# Patient Record
Sex: FEMALE | Race: WHITE
[De-identification: names, ages, dates, MRNs, and addresses within clinical notes are randomized per-mention and may not be internally consistent; named-entity substitution may affect disease eponyms.]

---

## 2020-09-09 ENCOUNTER — HOSPITAL ENCOUNTER (INPATIENT)
Dept: HOSPITAL 84 - D.ER | Age: 50
LOS: 7 days | Discharge: NURSING, REHAB OR LONG-TERM CARE | DRG: 481 | End: 2020-09-16
Attending: FAMILY MEDICINE | Admitting: FAMILY MEDICINE
Payer: COMMERCIAL

## 2020-09-09 VITALS — DIASTOLIC BLOOD PRESSURE: 75 MMHG | SYSTOLIC BLOOD PRESSURE: 134 MMHG

## 2020-09-09 VITALS — DIASTOLIC BLOOD PRESSURE: 66 MMHG | SYSTOLIC BLOOD PRESSURE: 130 MMHG

## 2020-09-09 VITALS — SYSTOLIC BLOOD PRESSURE: 131 MMHG | DIASTOLIC BLOOD PRESSURE: 78 MMHG

## 2020-09-09 VITALS — DIASTOLIC BLOOD PRESSURE: 72 MMHG | SYSTOLIC BLOOD PRESSURE: 140 MMHG

## 2020-09-09 VITALS — SYSTOLIC BLOOD PRESSURE: 143 MMHG | DIASTOLIC BLOOD PRESSURE: 77 MMHG

## 2020-09-09 VITALS
HEIGHT: 64 IN | HEIGHT: 64 IN | BODY MASS INDEX: 50.02 KG/M2 | WEIGHT: 293 LBS | BODY MASS INDEX: 50.02 KG/M2 | WEIGHT: 293 LBS | BODY MASS INDEX: 50.02 KG/M2

## 2020-09-09 VITALS — SYSTOLIC BLOOD PRESSURE: 150 MMHG | DIASTOLIC BLOOD PRESSURE: 72 MMHG

## 2020-09-09 VITALS — DIASTOLIC BLOOD PRESSURE: 79 MMHG | SYSTOLIC BLOOD PRESSURE: 151 MMHG

## 2020-09-09 VITALS — SYSTOLIC BLOOD PRESSURE: 164 MMHG | DIASTOLIC BLOOD PRESSURE: 79 MMHG
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
## 2020-09-09 DIAGNOSIS — D72.829: ICD-10-CM

## 2020-09-09 DIAGNOSIS — M54.9: ICD-10-CM

## 2020-09-09 DIAGNOSIS — G89.29: ICD-10-CM

## 2020-09-09 DIAGNOSIS — I10: ICD-10-CM

## 2020-09-09 DIAGNOSIS — S72.402A: Primary | ICD-10-CM

## 2020-09-09 DIAGNOSIS — S82.292A: ICD-10-CM

## 2020-09-09 LAB
ALBUMIN SERPL-MCNC: 3.1 G/DL (ref 3.4–5)
ALP SERPL-CCNC: 83 U/L (ref 30–120)
ALT SERPL-CCNC: 20 U/L (ref 10–68)
ANION GAP SERPL CALC-SCNC: 12.9 MMOL/L (ref 8–16)
APTT BLD: 31.4 SECONDS (ref 22.8–39.4)
BACTERIA #/AREA URNS HPF: (no result) /HPF
BASOPHILS NFR BLD AUTO: 0.2 % (ref 0–2)
BILIRUB SERPL-MCNC: 0.65 MG/DL (ref 0.2–1.3)
BILIRUB SERPL-MCNC: NEGATIVE MG/DL
BUN SERPL-MCNC: 15 MG/DL (ref 7–18)
CALCIUM SERPL-MCNC: 9.4 MG/DL (ref 8.5–10.1)
CHLORIDE SERPL-SCNC: 102 MMOL/L (ref 98–107)
CO2 SERPL-SCNC: 27.3 MMOL/L (ref 21–32)
CREAT SERPL-MCNC: 0.9 MG/DL (ref 0.6–1.3)
EOSINOPHIL NFR BLD: 1.1 % (ref 0–7)
ERYTHROCYTE [DISTWIDTH] IN BLOOD BY AUTOMATED COUNT: 13.5 % (ref 11.5–14.5)
GLOBULIN SER-MCNC: 4.8 G/L
GLUCOSE SERPL-MCNC: 146 MG/DL (ref 74–106)
HCT VFR BLD CALC: 42.3 % (ref 36–48)
HGB BLD-MCNC: 13.6 G/DL (ref 12–16)
IMM GRANULOCYTES NFR BLD: 0.2 % (ref 0–5)
INR PPP: 1.08 (ref 0.85–1.17)
KETONES UR STRIP-MCNC: NEGATIVE MG/DL
LYMPHOCYTES NFR BLD AUTO: 11.2 % (ref 15–50)
MCH RBC QN AUTO: 28.2 PG (ref 26–34)
MCHC RBC AUTO-ENTMCNC: 32.2 G/DL (ref 31–37)
MCV RBC: 87.8 FL (ref 80–100)
MONOCYTES NFR BLD: 4.1 % (ref 2–11)
NEUTROPHILS NFR BLD AUTO: 83.2 % (ref 40–80)
NITRITE UR-MCNC: NEGATIVE MG/ML
OSMOLALITY SERPL CALC.SUM OF ELEC: 279 MOSM/KG (ref 275–300)
PH UR STRIP: 7 [PH] (ref 5–6)
PLATELET # BLD: 385 10X3/UL (ref 130–400)
PMV BLD AUTO: 9.6 FL (ref 7.4–10.4)
POTASSIUM SERPL-SCNC: 4.2 MMOL/L (ref 3.5–5.1)
PROT SERPL-MCNC: 7.9 G/DL (ref 6.4–8.2)
PROTHROMBIN TIME: 14 SECONDS (ref 11.6–15)
RBC # BLD AUTO: 4.82 10X6/UL (ref 4–5.4)
SODIUM SERPL-SCNC: 138 MMOL/L (ref 136–145)
SQUAMOUS #/AREA URNS HPF: (no result) /HPF (ref 0–5)
UROBILINOGEN UR-MCNC: NORMAL MG/DL
WBC # BLD AUTO: 12.3 10X3/UL (ref 4.8–10.8)
WBC #/AREA URNS HPF: (no result) /HPF (ref 0–5)

## 2020-09-09 NOTE — NUR
RECEIVED PT TO ROOM #2239
FROM ER VIA BED. NO ACUTE DISTRESS NOTED. DENIES PAIN AT THIS
TIME. NS AT 150ML/HR TO PIV IN RIGHT HAND.

## 2020-09-10 VITALS — DIASTOLIC BLOOD PRESSURE: 75 MMHG | SYSTOLIC BLOOD PRESSURE: 154 MMHG

## 2020-09-10 VITALS — SYSTOLIC BLOOD PRESSURE: 145 MMHG | DIASTOLIC BLOOD PRESSURE: 72 MMHG

## 2020-09-10 VITALS — DIASTOLIC BLOOD PRESSURE: 93 MMHG | SYSTOLIC BLOOD PRESSURE: 138 MMHG

## 2020-09-10 LAB
ALBUMIN SERPL-MCNC: 2.7 G/DL (ref 3.4–5)
ALP SERPL-CCNC: 78 U/L (ref 30–120)
ALT SERPL-CCNC: 35 U/L (ref 10–68)
ANION GAP SERPL CALC-SCNC: 12.4 MMOL/L (ref 8–16)
BASOPHILS NFR BLD AUTO: 0.1 % (ref 0–2)
BILIRUB SERPL-MCNC: 0.74 MG/DL (ref 0.2–1.3)
BUN SERPL-MCNC: 18 MG/DL (ref 7–18)
CALCIUM SERPL-MCNC: 8.5 MG/DL (ref 8.5–10.1)
CHLORIDE SERPL-SCNC: 105 MMOL/L (ref 98–107)
CO2 SERPL-SCNC: 27.9 MMOL/L (ref 21–32)
CREAT SERPL-MCNC: 0.8 MG/DL (ref 0.6–1.3)
EOSINOPHIL NFR BLD: 1 % (ref 0–7)
ERYTHROCYTE [DISTWIDTH] IN BLOOD BY AUTOMATED COUNT: 13.4 % (ref 11.5–14.5)
GLOBULIN SER-MCNC: 3.6 G/L
GLUCOSE SERPL-MCNC: 122 MG/DL (ref 74–106)
HCT VFR BLD CALC: 38.7 % (ref 36–48)
HGB BLD-MCNC: 12.5 G/DL (ref 12–16)
IMM GRANULOCYTES NFR BLD: 0.2 % (ref 0–5)
LYMPHOCYTES NFR BLD AUTO: 21.6 % (ref 15–50)
MAGNESIUM SERPL-MCNC: 2 MG/DL (ref 1.8–2.4)
MCH RBC QN AUTO: 28.7 PG (ref 26–34)
MCHC RBC AUTO-ENTMCNC: 32.3 G/DL (ref 31–37)
MCV RBC: 88.8 FL (ref 80–100)
MONOCYTES NFR BLD: 7 % (ref 2–11)
NEUTROPHILS NFR BLD AUTO: 70.1 % (ref 40–80)
OSMOLALITY SERPL CALC.SUM OF ELEC: 283 MOSM/KG (ref 275–300)
PLATELET # BLD: 355 10X3/UL (ref 130–400)
PMV BLD AUTO: 9.6 FL (ref 7.4–10.4)
POTASSIUM SERPL-SCNC: 4.3 MMOL/L (ref 3.5–5.1)
PROT SERPL-MCNC: 6.3 G/DL (ref 6.4–8.2)
RBC # BLD AUTO: 4.36 10X6/UL (ref 4–5.4)
SODIUM SERPL-SCNC: 141 MMOL/L (ref 136–145)
WBC # BLD AUTO: 10.3 10X3/UL (ref 4.8–10.8)

## 2020-09-10 PROCEDURE — 0QSC04Z REPOSITION LEFT LOWER FEMUR WITH INTERNAL FIXATION DEVICE, OPEN APPROACH: ICD-10-PCS | Performed by: ORTHOPAEDIC SURGERY

## 2020-09-10 NOTE — NUR
MANY NEEDS SAW TO AS WELL AS TOTALL BED CHANGE AT THIS TIME BED LOW AND LOCKED
AND CALL LIGHT IS IN REACH

## 2020-09-10 NOTE — NUR
PT IS RESTING IN BED WITH EYES OPEN. RESPIRATIONS ARE EVEN AND UNLABORED.
FAMILY AT BEDSIDE. PT DENIES PRESENCE OF N/V/AT THIS TIME. PT DENIES PRESENCE
OF NUMBNESS/TINGLING TO BUE AND BLE. BRUISING NOTED TO LLE. BLE PEDAL PULSES
PALP AND BLE CAP REFILL IS < 3 SEC. O2 VIA NC @ 2L. PIV TO RIGHT HAND INFUSING
WITHOUT DIFFICULTY PER ORDER. BED IS IN THE LOWEST POSITION. CALL LIGHT AND
BEDSIDE TABLE ARE WITHIN REACH. SIDE RAILS X 2. PT DENIES FURTHER NEEDS. WILL
CONT TO MONITOR.

## 2020-09-10 NOTE — NUR
Rehab Note- Acute Inpatient Rehab prescreen order received. The patient has
[employer] insurance and will require a PreAuth prior to an inpatient acute rehab
stay. She had surgery today. Will need PT & OT Eval for PreAuth process. Will
follow at this time to await PT & OT Evals. Thank you for this referral!
Nessa Tidwell RN
Clinical Liaison, [facility]

## 2020-09-10 NOTE — NUR
MODERATE AMOUNT OF BLOODY DRAINAGE NOTED TO LEFT HIP/UPPER THIGH DRESSING
NOTED. DRESSING REINFORCED WITH 4X4 GUAZE AND TAPE. PT TOLERATED WELL.

## 2020-09-10 NOTE — NUR
PT ARRIVES TO ROOM VIA BED ESCORTED BY RECOVERY ROOM STAFF. PT IS AROUSABLE
WITH VERBAL STIMULATION AND IS AAOX 4 UPON AROUSAL. DRESSING TO LEFT THIGH
NOTED AND IS CDI. KNEE IMMOBILIZER ON LLE. PT REPORTS NUMBNESS TO LLE AND IS
UNABLE TO WIGGLE TOES AT THIS TIME. PT DENIES PRESENCE OF PAIN/N/V. FAMILY IS
AT BEDSIDE. FALL PRECAUTIONS IN PLACE. VSS. SEE FLOWSHEET. BED IS IN THE
LOWEST POSITION. CALL LIGHT AND BEDSIDE TABLE ARE WITHIN REACH. SIDE RAILS X
2. PT DENIES FURTHER NEEDS. WILL CONT TO MONITOR.

## 2020-09-10 NOTE — NUR
BONE FOAM AND BUMP UNDER LEFT HIP USED FOR OPTIMAL OPERATIVE
POSITION. ALCOHOL AND BLUE TOWELS USED TO CLEAN ENTIRE LEG BEFORE
CHLORAPREPPING.

## 2020-09-11 VITALS — SYSTOLIC BLOOD PRESSURE: 144 MMHG | DIASTOLIC BLOOD PRESSURE: 59 MMHG

## 2020-09-11 VITALS — SYSTOLIC BLOOD PRESSURE: 121 MMHG | DIASTOLIC BLOOD PRESSURE: 66 MMHG

## 2020-09-11 VITALS — SYSTOLIC BLOOD PRESSURE: 161 MMHG | DIASTOLIC BLOOD PRESSURE: 72 MMHG

## 2020-09-11 VITALS — DIASTOLIC BLOOD PRESSURE: 68 MMHG | SYSTOLIC BLOOD PRESSURE: 148 MMHG

## 2020-09-11 VITALS — DIASTOLIC BLOOD PRESSURE: 65 MMHG | SYSTOLIC BLOOD PRESSURE: 152 MMHG

## 2020-09-11 VITALS — DIASTOLIC BLOOD PRESSURE: 68 MMHG | SYSTOLIC BLOOD PRESSURE: 138 MMHG

## 2020-09-11 LAB
ALBUMIN SERPL-MCNC: 2.3 G/DL (ref 3.4–5)
ALP SERPL-CCNC: 78 U/L (ref 30–120)
ALT SERPL-CCNC: 47 U/L (ref 10–68)
ANION GAP SERPL CALC-SCNC: 11.8 MMOL/L (ref 8–16)
BASOPHILS NFR BLD AUTO: 0.1 % (ref 0–2)
BILIRUB SERPL-MCNC: 0.39 MG/DL (ref 0.2–1.3)
BUN SERPL-MCNC: 20 MG/DL (ref 7–18)
CALCIUM SERPL-MCNC: 8.1 MG/DL (ref 8.5–10.1)
CHLORIDE SERPL-SCNC: 108 MMOL/L (ref 98–107)
CO2 SERPL-SCNC: 24 MMOL/L (ref 21–32)
CREAT SERPL-MCNC: 1 MG/DL (ref 0.6–1.3)
EOSINOPHIL NFR BLD: 0.3 % (ref 0–7)
ERYTHROCYTE [DISTWIDTH] IN BLOOD BY AUTOMATED COUNT: 13.6 % (ref 11.5–14.5)
GLOBULIN SER-MCNC: 3.7 G/L
GLUCOSE SERPL-MCNC: 119 MG/DL (ref 74–106)
HCT VFR BLD CALC: 32.1 % (ref 36–48)
HGB BLD-MCNC: 10.3 G/DL (ref 12–16)
IMM GRANULOCYTES NFR BLD: 0.3 % (ref 0–5)
LYMPHOCYTES NFR BLD AUTO: 20.5 % (ref 15–50)
MAGNESIUM SERPL-MCNC: 2 MG/DL (ref 1.8–2.4)
MCH RBC QN AUTO: 28.2 PG (ref 26–34)
MCHC RBC AUTO-ENTMCNC: 32.1 G/DL (ref 31–37)
MCV RBC: 87.9 FL (ref 80–100)
MONOCYTES NFR BLD: 9.7 % (ref 2–11)
NEUTROPHILS NFR BLD AUTO: 69.1 % (ref 40–80)
OSMOLALITY SERPL CALC.SUM OF ELEC: 282 MOSM/KG (ref 275–300)
PLATELET # BLD: 315 10X3/UL (ref 130–400)
PMV BLD AUTO: 9.5 FL (ref 7.4–10.4)
POTASSIUM SERPL-SCNC: 3.8 MMOL/L (ref 3.5–5.1)
PROT SERPL-MCNC: 6 G/DL (ref 6.4–8.2)
RBC # BLD AUTO: 3.65 10X6/UL (ref 4–5.4)
SODIUM SERPL-SCNC: 140 MMOL/L (ref 136–145)
WBC # BLD AUTO: 13 10X3/UL (ref 4.8–10.8)

## 2020-09-11 NOTE — NUR
REhab Note- Attempted x4 with [facility] to be transferred to different "3rd Party"
insurance coverage to initiate PreAuth to reach a recording that their office
is closed at this time. Will attempt to initiate PreAuth again on Monday AM.
Thank you for this referral!
Nessa Tidwell RN
Clinical Liaison, [facility]

## 2020-09-12 VITALS — DIASTOLIC BLOOD PRESSURE: 68 MMHG | SYSTOLIC BLOOD PRESSURE: 145 MMHG

## 2020-09-12 VITALS — DIASTOLIC BLOOD PRESSURE: 74 MMHG | SYSTOLIC BLOOD PRESSURE: 175 MMHG

## 2020-09-12 VITALS — SYSTOLIC BLOOD PRESSURE: 184 MMHG | DIASTOLIC BLOOD PRESSURE: 76 MMHG

## 2020-09-12 VITALS — DIASTOLIC BLOOD PRESSURE: 64 MMHG | SYSTOLIC BLOOD PRESSURE: 157 MMHG

## 2020-09-12 VITALS — DIASTOLIC BLOOD PRESSURE: 76 MMHG | SYSTOLIC BLOOD PRESSURE: 185 MMHG

## 2020-09-12 VITALS — SYSTOLIC BLOOD PRESSURE: 142 MMHG | DIASTOLIC BLOOD PRESSURE: 66 MMHG

## 2020-09-12 VITALS — SYSTOLIC BLOOD PRESSURE: 145 MMHG | DIASTOLIC BLOOD PRESSURE: 68 MMHG

## 2020-09-12 LAB
ALBUMIN SERPL-MCNC: 2.2 G/DL (ref 3.4–5)
ALP SERPL-CCNC: 81 U/L (ref 30–120)
ALT SERPL-CCNC: 35 U/L (ref 10–68)
ANION GAP SERPL CALC-SCNC: 13.4 MMOL/L (ref 8–16)
BASOPHILS NFR BLD AUTO: 0.2 % (ref 0–2)
BILIRUB SERPL-MCNC: 0.38 MG/DL (ref 0.2–1.3)
BUN SERPL-MCNC: 19 MG/DL (ref 7–18)
CALCIUM SERPL-MCNC: 8.1 MG/DL (ref 8.5–10.1)
CHLORIDE SERPL-SCNC: 108 MMOL/L (ref 98–107)
CO2 SERPL-SCNC: 24.5 MMOL/L (ref 21–32)
CREAT SERPL-MCNC: 0.8 MG/DL (ref 0.6–1.3)
EOSINOPHIL NFR BLD: 2.8 % (ref 0–7)
ERYTHROCYTE [DISTWIDTH] IN BLOOD BY AUTOMATED COUNT: 14 % (ref 11.5–14.5)
GLOBULIN SER-MCNC: 3.5 G/L
GLUCOSE SERPL-MCNC: 117 MG/DL (ref 74–106)
HCT VFR BLD CALC: 31.5 % (ref 36–48)
HGB BLD-MCNC: 9.8 G/DL (ref 12–16)
IMM GRANULOCYTES NFR BLD: 0.5 % (ref 0–5)
LYMPHOCYTES NFR BLD AUTO: 30.2 % (ref 15–50)
MAGNESIUM SERPL-MCNC: 2 MG/DL (ref 1.8–2.4)
MCH RBC QN AUTO: 28 PG (ref 26–34)
MCHC RBC AUTO-ENTMCNC: 31.1 G/DL (ref 31–37)
MCV RBC: 90 FL (ref 80–100)
MONOCYTES NFR BLD: 9.1 % (ref 2–11)
NEUTROPHILS NFR BLD AUTO: 57.2 % (ref 40–80)
OSMOLALITY SERPL CALC.SUM OF ELEC: 285 MOSM/KG (ref 275–300)
PLATELET # BLD: 329 10X3/UL (ref 130–400)
PMV BLD AUTO: 9.8 FL (ref 7.4–10.4)
POTASSIUM SERPL-SCNC: 3.9 MMOL/L (ref 3.5–5.1)
PROT SERPL-MCNC: 5.7 G/DL (ref 6.4–8.2)
RBC # BLD AUTO: 3.5 10X6/UL (ref 4–5.4)
SODIUM SERPL-SCNC: 142 MMOL/L (ref 136–145)
WBC # BLD AUTO: 11 10X3/UL (ref 4.8–10.8)

## 2020-09-13 VITALS — SYSTOLIC BLOOD PRESSURE: 134 MMHG | DIASTOLIC BLOOD PRESSURE: 71 MMHG

## 2020-09-13 VITALS — DIASTOLIC BLOOD PRESSURE: 76 MMHG | SYSTOLIC BLOOD PRESSURE: 170 MMHG

## 2020-09-13 VITALS — SYSTOLIC BLOOD PRESSURE: 168 MMHG | DIASTOLIC BLOOD PRESSURE: 78 MMHG

## 2020-09-13 VITALS — DIASTOLIC BLOOD PRESSURE: 73 MMHG | SYSTOLIC BLOOD PRESSURE: 160 MMHG

## 2020-09-13 VITALS — SYSTOLIC BLOOD PRESSURE: 178 MMHG | DIASTOLIC BLOOD PRESSURE: 82 MMHG

## 2020-09-13 VITALS — SYSTOLIC BLOOD PRESSURE: 167 MMHG | DIASTOLIC BLOOD PRESSURE: 72 MMHG

## 2020-09-13 VITALS — SYSTOLIC BLOOD PRESSURE: 160 MMHG | DIASTOLIC BLOOD PRESSURE: 73 MMHG

## 2020-09-13 LAB
ALBUMIN SERPL-MCNC: 2.2 G/DL (ref 3.4–5)
ALP SERPL-CCNC: 95 U/L (ref 30–120)
ALT SERPL-CCNC: 58 U/L (ref 10–68)
ANION GAP SERPL CALC-SCNC: 10.3 MMOL/L (ref 8–16)
BASOPHILS NFR BLD AUTO: 0.3 % (ref 0–2)
BILIRUB SERPL-MCNC: 0.42 MG/DL (ref 0.2–1.3)
BUN SERPL-MCNC: 16 MG/DL (ref 7–18)
CALCIUM SERPL-MCNC: 8.4 MG/DL (ref 8.5–10.1)
CHLORIDE SERPL-SCNC: 105 MMOL/L (ref 98–107)
CO2 SERPL-SCNC: 27.5 MMOL/L (ref 21–32)
CREAT SERPL-MCNC: 0.7 MG/DL (ref 0.6–1.3)
EOSINOPHIL NFR BLD: 3.7 % (ref 0–7)
ERYTHROCYTE [DISTWIDTH] IN BLOOD BY AUTOMATED COUNT: 13.6 % (ref 11.5–14.5)
GLOBULIN SER-MCNC: 3.8 G/L
GLUCOSE SERPL-MCNC: 126 MG/DL (ref 74–106)
HCT VFR BLD CALC: 30.9 % (ref 36–48)
HGB BLD-MCNC: 10 G/DL (ref 12–16)
IMM GRANULOCYTES NFR BLD: 0.5 % (ref 0–5)
LYMPHOCYTES NFR BLD AUTO: 27 % (ref 15–50)
MAGNESIUM SERPL-MCNC: 1.9 MG/DL (ref 1.8–2.4)
MCH RBC QN AUTO: 28.3 PG (ref 26–34)
MCHC RBC AUTO-ENTMCNC: 32.4 G/DL (ref 31–37)
MCV RBC: 87.5 FL (ref 80–100)
MONOCYTES NFR BLD: 6.7 % (ref 2–11)
NEUTROPHILS NFR BLD AUTO: 61.8 % (ref 40–80)
OSMOLALITY SERPL CALC.SUM OF ELEC: 280 MOSM/KG (ref 275–300)
PLATELET # BLD: 326 10X3/UL (ref 130–400)
PMV BLD AUTO: 9.7 FL (ref 7.4–10.4)
POTASSIUM SERPL-SCNC: 3.8 MMOL/L (ref 3.5–5.1)
PROT SERPL-MCNC: 6 G/DL (ref 6.4–8.2)
RBC # BLD AUTO: 3.53 10X6/UL (ref 4–5.4)
SODIUM SERPL-SCNC: 139 MMOL/L (ref 136–145)
WBC # BLD AUTO: 12 10X3/UL (ref 4.8–10.8)

## 2020-09-14 VITALS — SYSTOLIC BLOOD PRESSURE: 182 MMHG | DIASTOLIC BLOOD PRESSURE: 77 MMHG

## 2020-09-14 VITALS — SYSTOLIC BLOOD PRESSURE: 166 MMHG | DIASTOLIC BLOOD PRESSURE: 75 MMHG

## 2020-09-14 VITALS — DIASTOLIC BLOOD PRESSURE: 69 MMHG | SYSTOLIC BLOOD PRESSURE: 161 MMHG

## 2020-09-14 VITALS — DIASTOLIC BLOOD PRESSURE: 79 MMHG | SYSTOLIC BLOOD PRESSURE: 166 MMHG

## 2020-09-14 VITALS — SYSTOLIC BLOOD PRESSURE: 159 MMHG | DIASTOLIC BLOOD PRESSURE: 84 MMHG

## 2020-09-14 VITALS — SYSTOLIC BLOOD PRESSURE: 158 MMHG | DIASTOLIC BLOOD PRESSURE: 76 MMHG

## 2020-09-14 LAB
ALBUMIN SERPL-MCNC: 2.3 G/DL (ref 3.4–5)
ALP SERPL-CCNC: 90 U/L (ref 30–120)
ALT SERPL-CCNC: 44 U/L (ref 10–68)
ANION GAP SERPL CALC-SCNC: 7.1 MMOL/L (ref 8–16)
BASOPHILS NFR BLD AUTO: 0.2 % (ref 0–2)
BILIRUB SERPL-MCNC: 0.36 MG/DL (ref 0.2–1.3)
BUN SERPL-MCNC: 14 MG/DL (ref 7–18)
CALCIUM SERPL-MCNC: 8.3 MG/DL (ref 8.5–10.1)
CHLORIDE SERPL-SCNC: 105 MMOL/L (ref 98–107)
CO2 SERPL-SCNC: 29.8 MMOL/L (ref 21–32)
CREAT SERPL-MCNC: 0.7 MG/DL (ref 0.6–1.3)
EOSINOPHIL NFR BLD: 4.2 % (ref 0–7)
ERYTHROCYTE [DISTWIDTH] IN BLOOD BY AUTOMATED COUNT: 13.3 % (ref 11.5–14.5)
GLOBULIN SER-MCNC: 3.3 G/L
GLUCOSE SERPL-MCNC: 135 MG/DL (ref 74–106)
HCT VFR BLD CALC: 32.5 % (ref 36–48)
HGB BLD-MCNC: 10.2 G/DL (ref 12–16)
IMM GRANULOCYTES NFR BLD: 0.4 % (ref 0–5)
LYMPHOCYTES NFR BLD AUTO: 26.8 % (ref 15–50)
MAGNESIUM SERPL-MCNC: 1.8 MG/DL (ref 1.8–2.4)
MCH RBC QN AUTO: 27.6 PG (ref 26–34)
MCHC RBC AUTO-ENTMCNC: 31.4 G/DL (ref 31–37)
MCV RBC: 88.1 FL (ref 80–100)
MONOCYTES NFR BLD: 6 % (ref 2–11)
NEUTROPHILS NFR BLD AUTO: 62.4 % (ref 40–80)
OSMOLALITY SERPL CALC.SUM OF ELEC: 278 MOSM/KG (ref 275–300)
PLATELET # BLD: 374 10X3/UL (ref 130–400)
PMV BLD AUTO: 9.2 FL (ref 7.4–10.4)
POTASSIUM SERPL-SCNC: 3.9 MMOL/L (ref 3.5–5.1)
PROT SERPL-MCNC: 5.6 G/DL (ref 6.4–8.2)
RBC # BLD AUTO: 3.69 10X6/UL (ref 4–5.4)
SODIUM SERPL-SCNC: 138 MMOL/L (ref 136–145)
WBC # BLD AUTO: 9 10X3/UL (ref 4.8–10.8)

## 2020-09-14 NOTE — NUR
OT NOTE: PT REQUESTING TO GO TO BATHROOM IN AM. PT WAS UP IN CHAIR; SIT TO
STAND WITH MOD ASSIST AND USE OF WALKER; MIN/MOD ASSIST FOR TRANSFER TO BS
COMMODE; MAX ASSIST WITH TOILET HYGIENE. TRANSFER BACK TO CHAIR, HOWEVER, PT
BECAME LIGHT HEADED AND HAD TO ASSIST PT TO CHAIR WITH MAX ASSIST X
2...ELEVATED LES AND PROVIDED WITH WET WASHCLOTH. PT FEELING BETTER IN APPROX
5 MIN. ABLE TO PERFORM ALL GROOMING TASKS WITH SET UP; EXT ASSIST WITH LE
DRESSING. EDUCATED IN EXS TO BE PERFORMED AT CHAIR LEVEL.
OLAMIDE CLEMENTS, OTR/L
[phone]

## 2020-09-14 NOTE — NUR
OT NOTE: PT COMPLETED BED MOB TASKS WITH MOD A. PT COMPLETED UB HYGIENE TASKS
WITH MIRNA.
[phone]
THANK YOU,LADONNA MOSQUEDA

## 2020-09-14 NOTE — NUR
LYING IN BED. ALERT AND ORIENTED X4. C/O PAIN IN LLE AS 6. IMMOBILIZER NOTED
TO LLE. DRSG TO LT HIP IS C/D/I. EDEMA NOTED TO LLE. TELEMETRY SHOWS SR WITH
RATE OF 78. IV OUT. REFUSES TO BE STUCK AT THIS TIME. DRINKING FLUIDS.
PUREWICK CATH IN PLACE DRAINING CLEAR YELLOW URINE. DENIES NEEDS. SR ELEVATED
X2. CL IN REACH.

## 2020-09-15 VITALS — DIASTOLIC BLOOD PRESSURE: 80 MMHG | SYSTOLIC BLOOD PRESSURE: 126 MMHG

## 2020-09-15 VITALS — SYSTOLIC BLOOD PRESSURE: 153 MMHG | DIASTOLIC BLOOD PRESSURE: 79 MMHG

## 2020-09-15 VITALS — DIASTOLIC BLOOD PRESSURE: 74 MMHG | SYSTOLIC BLOOD PRESSURE: 164 MMHG

## 2020-09-15 VITALS — SYSTOLIC BLOOD PRESSURE: 144 MMHG | DIASTOLIC BLOOD PRESSURE: 72 MMHG

## 2020-09-15 VITALS — SYSTOLIC BLOOD PRESSURE: 151 MMHG | DIASTOLIC BLOOD PRESSURE: 74 MMHG

## 2020-09-15 VITALS — SYSTOLIC BLOOD PRESSURE: 144 MMHG | DIASTOLIC BLOOD PRESSURE: 77 MMHG

## 2020-09-15 LAB
ALBUMIN SERPL-MCNC: 2.5 G/DL (ref 3.4–5)
ALP SERPL-CCNC: 98 U/L (ref 30–120)
ALT SERPL-CCNC: 39 U/L (ref 10–68)
ANION GAP SERPL CALC-SCNC: 9.8 MMOL/L (ref 8–16)
BASOPHILS NFR BLD AUTO: 0.3 % (ref 0–2)
BILIRUB SERPL-MCNC: 0.38 MG/DL (ref 0.2–1.3)
BUN SERPL-MCNC: 16 MG/DL (ref 7–18)
CALCIUM SERPL-MCNC: 8.7 MG/DL (ref 8.5–10.1)
CHLORIDE SERPL-SCNC: 103 MMOL/L (ref 98–107)
CO2 SERPL-SCNC: 30.1 MMOL/L (ref 21–32)
CREAT SERPL-MCNC: 0.8 MG/DL (ref 0.6–1.3)
EOSINOPHIL NFR BLD: 10.4 % (ref 0–7)
ERYTHROCYTE [DISTWIDTH] IN BLOOD BY AUTOMATED COUNT: 13.3 % (ref 11.5–14.5)
GLOBULIN SER-MCNC: 3.6 G/L
GLUCOSE SERPL-MCNC: 122 MG/DL (ref 74–106)
HCT VFR BLD CALC: 34.4 % (ref 36–48)
HGB BLD-MCNC: 10.7 G/DL (ref 12–16)
IMM GRANULOCYTES NFR BLD: 4.2 % (ref 0–5)
LYMPHOCYTES NFR BLD AUTO: 23 % (ref 15–50)
MCH RBC QN AUTO: 27.4 PG (ref 26–34)
MCHC RBC AUTO-ENTMCNC: 31.1 G/DL (ref 31–37)
MCV RBC: 88.2 FL (ref 80–100)
MONOCYTES NFR BLD: 6.3 % (ref 2–11)
NEUTROPHILS NFR BLD AUTO: 55.8 % (ref 40–80)
OSMOLALITY SERPL CALC.SUM OF ELEC: 279 MOSM/KG (ref 275–300)
PLATELET # BLD: 429 10X3/UL (ref 130–400)
PMV BLD AUTO: 9.3 FL (ref 7.4–10.4)
POTASSIUM SERPL-SCNC: 3.9 MMOL/L (ref 3.5–5.1)
PROT SERPL-MCNC: 6.1 G/DL (ref 6.4–8.2)
RBC # BLD AUTO: 3.9 10X6/UL (ref 4–5.4)
SODIUM SERPL-SCNC: 139 MMOL/L (ref 136–145)
WBC # BLD AUTO: 8.8 10X3/UL (ref 4.8–10.8)

## 2020-09-15 NOTE — NUR
A&0 X 4, UPINE IN BED. PUREWIC IN USE. DRESSING TO LEFT THIGH CDI. LEG
IMMOBILIZER ADJUSTED FOR COMFORT, PAIN 6/10. CTM.

## 2020-09-15 NOTE — NUR
REPOSITIONED FOR COMFORT. PADS CHANGED UNDER PT AT THIS TIME AND NEW ONES IN
PLACE. PUREWICK WORKING PROPERLY. NO DISTRESS. CL IN REACH.

## 2020-09-15 NOTE — MORECARE
CASE MANAGEMENT DISCHARGE SUMMARY
 
 
PATIENT: CAROLYN ANGULO                 UNIT: [id number]
ACCOUNT#: [id number]                       ADM DATE: 20
AGE: 50     : [birth year]  SEX: F            ROOM/BED: D.2239    
AUTHOR: ANTOINE VELIZ                             PHYSICIAN:                               
 
REFERRING PHYSICIAN: EVENS HAMMOND MD          
DATE OF SERVICE: 09/15/20
Discharge Plan
 
 
Patient Name: CAROLYN ANGULO
Facility: [facility]:[place]
Encounter #: [id number]
Medical Record #: [id number]
: 1970
Planned Disposition: Inpatient Rehab
Anticipated Discharge Date: 
 
Discharge Date: 
Expected LOS: 
Initial Reviewer: [id number]
Initial Review Date: 2020
Generated: 9/15/20  11:11 am 
Comments
 
DCP- Discharge Planning
 
Updated by [id number]: Maira Robb on 9/15/20   9:10 am CT
Patient Name: CAROLYN ANGULO                                     
Admission Status: ER   
Accout number: [id number]                              
Admission Date: 2020   
: 1970                                                        
Admission Diagnosis:PAIN IN LEFT LOWER LEG   
Attending: TRIXIE                                                
Current LOS:  6   
  
Anticipated DC Date:    
Planned Disposition: Inpatient Rehab   
Primary Insurance: [employer] PPO   
  
  
Discharge Planning Comments: CM met with patient at bedside after explaining 
CM role and obtaining verbal consent. CM discussed availability / needs of 
home health, REHAB and medical equipment. STATES WOULD BENEFIT FROM [facility], [place] 
SIGNED FOR [facility] NP. CM TO FOLLOW AND ASSIST AS NEEDED.   
  
 
  
  
  
  
  
: Maira Robb
  
 
 
 
 
 
 
 
Patient Name: CAROLYN ANGULO
Encounter #: [id number]
Page [id number]
 
 
 
 
 
Electronically Signed by ANTOINE VELIZ on 09/15/20 at 1012
 
 
 
 
 
 
**All edits/amendments must be made on the electronic document**
 
DICTATION DATE: 09/15/20 1011     : JANELLE  09/15/20 1011     
RPT#: [id number]                                DC DATE:        
                                               STATUS: ADM IN  
[facility]
[address] [place] [address]
***END OF REPORT***

## 2020-09-15 NOTE — NUR
OT NOTE: PT PERFORMED WELL TODAY. ABLE TO PERFORM UE DRSG AND SIMPLE GROOMING
TASKS FROM BED/CHAIR LEVEL; EXTENSIVE ASSIST WITH LE DRESSING AT THIS TIME.
BED MOB (SUPINE TO SIT) WITH MIN ASSIST FOR L LE MGMT; EOB SITTING WITH GOOD
STATIC BALANCE; ABLE TO PERFORM UE EXS WHILE SITTING ON EOB. TRANSFER FROM BED
TO CHAIR WITH WALKER AND MIN/MOD ASSIST.
OLAMIDE CLEMENTS, OTR/L
[phone]

## 2020-09-15 NOTE — NUR
OT  NOTE: PT COMPLETED SITTING BALANCE WITH SBA. PT COMPLETED CHAIR PUSHUPS
WITH MOD A. PT COMPLETED UE AROM WITH FUNCTIONAL TASKS. PT REQUIRED CUES TO
MAINTAIN PRECAUTIONS.
[phone]
JOHNSON CRANDALL COTA

## 2020-09-16 ENCOUNTER — HOSPITAL ENCOUNTER (INPATIENT)
Dept: HOSPITAL 84 - D.REHAB | Age: 50
LOS: 16 days | Discharge: HOME HEALTH | DRG: 561 | End: 2020-10-02
Attending: FAMILY MEDICINE | Admitting: FAMILY MEDICINE
Payer: COMMERCIAL

## 2020-09-16 VITALS — DIASTOLIC BLOOD PRESSURE: 72 MMHG | SYSTOLIC BLOOD PRESSURE: 144 MMHG

## 2020-09-16 VITALS — SYSTOLIC BLOOD PRESSURE: 136 MMHG | DIASTOLIC BLOOD PRESSURE: 68 MMHG

## 2020-09-16 VITALS — SYSTOLIC BLOOD PRESSURE: 140 MMHG | DIASTOLIC BLOOD PRESSURE: 60 MMHG

## 2020-09-16 VITALS
BODY MASS INDEX: 50.02 KG/M2 | HEIGHT: 64 IN | WEIGHT: 293 LBS | BODY MASS INDEX: 50.02 KG/M2 | BODY MASS INDEX: 50.02 KG/M2

## 2020-09-16 VITALS — SYSTOLIC BLOOD PRESSURE: 140 MMHG | DIASTOLIC BLOOD PRESSURE: 79 MMHG

## 2020-09-16 VITALS — SYSTOLIC BLOOD PRESSURE: 159 MMHG | DIASTOLIC BLOOD PRESSURE: 81 MMHG

## 2020-09-16 DIAGNOSIS — Z79.01: ICD-10-CM
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
## 2020-09-16 DIAGNOSIS — D72.829: ICD-10-CM

## 2020-09-16 DIAGNOSIS — S82.142D: ICD-10-CM

## 2020-09-16 DIAGNOSIS — G89.29: ICD-10-CM

## 2020-09-16 DIAGNOSIS — S72.402D: Primary | ICD-10-CM

## 2020-09-16 DIAGNOSIS — R06.02: ICD-10-CM

## 2020-09-16 DIAGNOSIS — E66.01: ICD-10-CM

## 2020-09-16 DIAGNOSIS — R53.83: ICD-10-CM

## 2020-09-16 DIAGNOSIS — R53.81: ICD-10-CM

## 2020-09-16 DIAGNOSIS — R26.9: ICD-10-CM

## 2020-09-16 DIAGNOSIS — I10: ICD-10-CM

## 2020-09-16 DIAGNOSIS — M62.81: ICD-10-CM

## 2020-09-16 LAB
ALBUMIN SERPL-MCNC: 2.6 G/DL (ref 3.4–5)
ALP SERPL-CCNC: 99 U/L (ref 30–120)
ALT SERPL-CCNC: 37 U/L (ref 10–68)
ANION GAP SERPL CALC-SCNC: 10.6 MMOL/L (ref 8–16)
BASOPHILS NFR BLD AUTO: 0.6 % (ref 0–2)
BILIRUB SERPL-MCNC: 0.36 MG/DL (ref 0.2–1.3)
BUN SERPL-MCNC: 17 MG/DL (ref 7–18)
CALCIUM SERPL-MCNC: 9 MG/DL (ref 8.5–10.1)
CHLORIDE SERPL-SCNC: 102 MMOL/L (ref 98–107)
CO2 SERPL-SCNC: 29 MMOL/L (ref 21–32)
CREAT SERPL-MCNC: 0.8 MG/DL (ref 0.6–1.3)
EOSINOPHIL NFR BLD: 4.2 % (ref 0–7)
ERYTHROCYTE [DISTWIDTH] IN BLOOD BY AUTOMATED COUNT: 13.5 % (ref 11.5–14.5)
GLOBULIN SER-MCNC: 4.1 G/L
GLUCOSE SERPL-MCNC: 140 MG/DL (ref 74–106)
HCT VFR BLD CALC: 35 % (ref 36–48)
HGB BLD-MCNC: 11 G/DL (ref 12–16)
IMM GRANULOCYTES NFR BLD: 0.6 % (ref 0–5)
LYMPHOCYTES NFR BLD AUTO: 19.9 % (ref 15–50)
MCH RBC QN AUTO: 27.8 PG (ref 26–34)
MCHC RBC AUTO-ENTMCNC: 31.4 G/DL (ref 31–37)
MCV RBC: 88.4 FL (ref 80–100)
MONOCYTES NFR BLD: 12 % (ref 2–11)
NEUTROPHILS NFR BLD AUTO: 62.7 % (ref 40–80)
OSMOLALITY SERPL CALC.SUM OF ELEC: 279 MOSM/KG (ref 275–300)
PLATELET # BLD: 426 10X3/UL (ref 130–400)
PMV BLD AUTO: 9.4 FL (ref 7.4–10.4)
POTASSIUM SERPL-SCNC: 3.6 MMOL/L (ref 3.5–5.1)
PROT SERPL-MCNC: 6.7 G/DL (ref 6.4–8.2)
RBC # BLD AUTO: 3.96 10X6/UL (ref 4–5.4)
SODIUM SERPL-SCNC: 138 MMOL/L (ref 136–145)
WBC # BLD AUTO: 6.7 10X3/UL (ref 4.8–10.8)

## 2020-09-16 NOTE — OP
PATIENT NAME:  CAROLYN ELIZALDE                       MEDICAL RECORD: [id number]
:[birth year]                                             LOCATION:D.MS [id number]
                                                         ADMISSION DATE:20
SURGEON:  OJE CROWE MD           
 
 
DATE OF OPERATION:  09/10/2020
 
PREOPERATIVE DIAGNOSIS:  Left intercondylar distal femur fracture.
 
POSTOPERATIVE DIAGNOSIS:  Left intercondylar distal femur fracture.
 
PROCEDURE PERFORMED:  ORIF, left intercondylar distal femur fracture.
 
INDICATIONS FOR PROCEDURE:  Ms. Elizalde is a 50-year-old female who fell
approximately 10 days ago and injured her left knee.  She complained of pain and
inability to weight bear and initial x-rays were negative for fracture.  She
returned to [place] yesterday and x-rays showed distal femur fracture with
slight displacement of the lateral femoral condyle.  CT scan was obtained and
showed an intraarticular split involving the distal femur and lateral femoral
condyle.  I talked to her about this condition and need for surgery.  Risks,
benefits, and alternatives of surgery were discussed with the patient and
consent was obtained.
 
DESCRIPTION OF PROCEDURE:  The patient was met in the holding area where her
identity and confirmation of procedure was performed.  Left lower extremity was
marked.  She was taken to the operating room where she was placed supine on the
operating table and anesthesia was administered.  Left leg was prepped and
draped in a sterile fashion.  The patient received preoperative antibiotics and
a time-out was performed before initiating the case.  On initiation of the case,
an anterolateral approach to the distal femur was utilized for exposure.  We
incised through the skin and subcutaneous tissues, dissecting down to the IT
band and lateral retinaculum.  Tissues were then split in line to allow for
exposure into the knee joint and distal femur.  A large hematoma was expressed
from the knee joint.  The tissues were exposed and the fracture was identified
extending across the lateral femoral condyle and into the intercondylar notch. 
The fracture was debrided and irrigated thoroughly with saline.  With
manipulation and knee flexion, we were able to restore alignment of the condyle.
 We then placed a point-to-point clamp anterior and posterior as well as a Urban
Tong clamp medial to lateral.  With these clamps in place, we were able to
obtain good compression at the articular surface and good alignment of our
fracture pieces.  Alignment was confirmed under imaging as well.  We then placed
2 Dale headless compression screws.  A 4-0 was placed anterior to posterior
into the lateral femoral condyle.  A second 5-0 screw was placed lateral to
medial at the distal border of the femoral condyle to apply compression across
the fracture site in that direction as well.  This achieved good restoration of
our joint surface and compression.  A 12-hole Biomet distal femur plate was then
positioned along the lateral cortex of the distal femur and adjusted for
position.  It was then pinned into place both proximal and distal.  A cortical
screw was then placed through the #4 hole of the plate proximal to our fracture
extending into the shaft.  This component pressed the plate to the bone.  We
were then able to place 6 locking screws distally using the outrigger guide.  We
then placed 3 more cortical screws proximally to complete our fixation.  Final
images were obtained and showed good alignment and fixation of our fracture. 
Wounds were irrigated thoroughly with saline including the knee joint.  The
lateral retinaculum and IT band were closed with #1 Vicryl suture.  Subcutaneous
tissue was irrigated thoroughly as well.  The subcutaneous tissue was closed
with 2-0 Vicryl and the skin was closed with staples.  A sterile dressing was
 
 
 
OPERATIVE REPORT                               [id number]    CAROLYN ELIZALDE     
 
 
placed.  The patient was placed into a knee immobilizer and turned back over to
anesthesia.  She was awakened, extubated, and taken to recovery room in stable
condition.
 
POSTOPERATIVE PLAN:  The patient is going to return to the floor for
postoperative care.  She needs to remain nonweightbearing on the left leg for 3
months.  She needs to stay in her knee immobilizer for now but can come out with
physical therapy to begin knee range of motion exercises.  May need a course of
rehab upon discharge.
 
ANESTHESIA:  General with peripheral nerve block.
 
COMPLICATIONS:  None.
 
ESTIMATED BLOOD LOSS:  250 mL.
 
NTS:[id number] Voice Confirmation ID: [id number] DOCUMENT ID: [id number]
                                           
                                           JOE CROWE MD           
 
 
 
Electronically Signed by JOE CROWE on 20 at 0843
 
 
 
 
 
 
 
 
 
 
 
 
 
 
 
 
 
 
 
 
 
 
 
 
CC:                                                             [phone]
DICTATION DATE: 09/10/20 1341     :     09/10/20 1950      ADM IN  
                                                                              
[place] [id number] [place] [id number]

## 2020-09-16 NOTE — NUR
Rehab Note(Late Entry for 9/13/2020)- Was able to reach [facility] to initiate
PreAut process & faxed clinicals in to [phone], Ref#[id number].
Nessa Tidwell RN
Clinical Liaison, [facility]

## 2020-09-16 NOTE — NUR
OT NOTE: PT UP IN CHAIR..STATES THAT SHE IS VERY WET AS THE PURE WIK WAS NOT
FUNCTIONING PROPERLY.  SIT TO STAND FROM CHAIR WITH WALKER, GAIT BELT, AND MOD
ASSIST X 2; CLEANED PT AND PROVIDED CLEAN GOWN. TRANSFER FROM BED TO CHAIR
WITH MIN/MOD ASSIST X 2.. PT BECOMES VERY ANXIOUS PRIOR TO GETTING TO BED.
REQUIRES CUES TO SLOW DOWN. BACK TO BED WITH MIN ASSIST; REPOSITIONED UP IN
BED WITHOUT ASSIST; REPLACED PUREWICK..PT PERFORMED GROOMING INCLUDING HAND
AND FACE WASHING WITH SET UP
OLAMIDE CLEMENTS, OTR/L
[phone]

## 2020-09-16 NOTE — NUR
Rehab Note- [facility]. Contacted Zuleima Anderson RN at [phone] Ext [phone], voicemail left to clarify if was a
mistake for SNF stay instead of Acute Rehab stay. Will await clarification at
this time.
Nessa Tidwell RN
Clinical Liaison, [facility]

## 2020-09-16 NOTE — NUR
PT ASLEEP AROUSES EASILY TO VOICE, RESPIRATION EVEN/UNLABORED, NO NEEDS NOTED,
PT SAFETY PRECAUTIONS IN PLACE, FLUIDS/CALL LIGHT WITHIN REACH

## 2020-09-16 NOTE — NUR
Rehab Note- Dr Rafiq Conner's nurse, called [facility] contact to check with
pending Auth. Received fax and call from [place] with [facility] with approval for
inpatient acute rehab stay for 7 day approval. Auth #[id number]. Thank you
for this referral!
Nessa Tidwell RN
Clinical Liaison, [facility]

## 2020-09-16 NOTE — NUR
PT ARRIVED VIA WC, ACCOMPANIED BY HOSP. STAFF WITH BELONGINGS. PT A&O,
MAIA, FRACTURED FEMUR ON 08/29 DURING A FALL, NO OTHER SKIN ISSUES NOTED,
IMMOBILIZER PT IS NWB TO LEFT LEG
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
USED TO WORK REHAB HERE, MAIA, ACCLIMATED PT TO ROOM,
SURROUNDINGS, STAFF, NO IMMEDIATE NEEDS NOTED, RESPIRATIONS EVEN/UNLABORED,
FLUIDS/CALL LIGHT WITHIN REACH, VS TAKEN, ASSESSMENTS IN PROCESS, CONSENTS
SIGNED, ID BAND ON.

## 2020-09-17 VITALS — SYSTOLIC BLOOD PRESSURE: 151 MMHG | DIASTOLIC BLOOD PRESSURE: 89 MMHG

## 2020-09-17 VITALS — SYSTOLIC BLOOD PRESSURE: 116 MMHG | DIASTOLIC BLOOD PRESSURE: 60 MMHG

## 2020-09-17 LAB
ANION GAP SERPL CALC-SCNC: 10.7 MMOL/L (ref 8–16)
BASOPHILS NFR BLD AUTO: 0.6 % (ref 0–2)
BUN SERPL-MCNC: 15 MG/DL (ref 7–18)
CALCIUM SERPL-MCNC: 8.7 MG/DL (ref 8.5–10.1)
CHLORIDE SERPL-SCNC: 102 MMOL/L (ref 98–107)
CO2 SERPL-SCNC: 30.1 MMOL/L (ref 21–32)
CREAT SERPL-MCNC: 0.9 MG/DL (ref 0.6–1.3)
EOSINOPHIL NFR BLD: 2.7 % (ref 0–7)
ERYTHROCYTE [DISTWIDTH] IN BLOOD BY AUTOMATED COUNT: 13.7 % (ref 11.5–14.5)
GLUCOSE SERPL-MCNC: 131 MG/DL (ref 74–106)
HCT VFR BLD CALC: 35.7 % (ref 36–48)
HGB BLD-MCNC: 11.4 G/DL (ref 12–16)
IMM GRANULOCYTES NFR BLD: 0.6 % (ref 0–5)
LYMPHOCYTES NFR BLD AUTO: 34.7 % (ref 15–50)
MCH RBC QN AUTO: 28.1 PG (ref 26–34)
MCHC RBC AUTO-ENTMCNC: 31.9 G/DL (ref 31–37)
MCV RBC: 88.1 FL (ref 80–100)
MONOCYTES NFR BLD: 16.7 % (ref 2–11)
NEUTROPHILS NFR BLD AUTO: 44.7 % (ref 40–80)
OSMOLALITY SERPL CALC.SUM OF ELEC: 280 MOSM/KG (ref 275–300)
PLATELET # BLD: 418 10X3/UL (ref 130–400)
PMV BLD AUTO: 9.4 FL (ref 7.4–10.4)
POTASSIUM SERPL-SCNC: 3.8 MMOL/L (ref 3.5–5.1)
RBC # BLD AUTO: 4.05 10X6/UL (ref 4–5.4)
SODIUM SERPL-SCNC: 139 MMOL/L (ref 136–145)
WBC # BLD AUTO: 5.2 10X3/UL (ref 4.8–10.8)

## 2020-09-17 NOTE — NUR
PATIENT ADMITTED TO [facility] FROM ACUTE FLOOR. DISCHARGE PLANS ARE FOR HER TO
RETURN TO HER HOME. PATIENT IS NOT FEELING WELL THIS AM WILL COME AT ANOTHER
TIME TO VISIT. WILL CONTINUE TO FOLLOW WITH PATIENT.

## 2020-09-17 NOTE — MORECARE
CASE MANAGEMENT DISCHARGE SUMMARY
 
 
PATIENT: CAROLYN ANGULO                 UNIT: [id number]
ACCOUNT#: [id number]                       ADM DATE: 20
AGE: 50     : 68  SEX: F            ROOM/BED: D.2239    
AUTHOR: ANTOINE VELIZ                             PHYSICIAN:                               
 
REFERRING PHYSICIAN: EVENS HAMMOND MD          
DATE OF SERVICE: 20
Discharge Plan
 
 
Patient Name: CAROLYN ANGULO
Facility: [facility]:[place]
Encounter #: [id number]
Medical Record #: [id number]
: 1970
Planned Disposition: Inpatient Rehab
Anticipated Discharge Date: 
 
Discharge Date: 2020
Expected LOS: 
Initial Reviewer: [id number]
Initial Review Date: 2020
Generated: 20   6:11 pm 
Comments
 
DCP- Discharge Planning
 
Updated by [id number]: Maira Robb on 9/15/20   9:10 am CT
Patient Name: CAROLYN ANGULO                                     
Admission Status: ER   
Accout number: [id number]                              
Admission Date: 2020   
: 1970                                                        
Admission Diagnosis:PAIN IN LEFT LOWER LEG   
Attending: TRIXIE                                                
Current LOS:  6   
  
Anticipated DC Date:    
Planned Disposition: Inpatient Rehab   
Primary Insurance: [employer] PPO   
  
  
Discharge Planning Comments: CM met with patient at bedside after explaining 
CM role and obtaining verbal consent. CM discussed availability / needs of 
home health, REHAB and medical equipment. STATES WOULD BENEFIT FROM [facility], [place] 
SIGNED FOR [facility]. CM TO FOLLOW AND ASSIST AS NEEDED.   
  
 
  
  
  
  
  
: Maira Robb
  
 
 
 
 
 
 
 
 
Last DP export: 9/15/20   9:12 a
Patient Name: CAROLYN ANGULO
Encounter #: [id number]
Page [id number]
 
 
 
 
 
Electronically Signed by ANTOINE VELIZ on 20 at 1712
 
 
 
 
 
 
**All edits/amendments must be made on the electronic document**
 
DICTATION DATE: 20     : JANELLE  20     
RPT#: [id number]                                DC DATE:20
                                               STATUS: DIS IN  
[facility]
 [place] [address]
***END OF REPORT***

## 2020-09-17 NOTE — NUR
PT IN BED ON PHONE, TV ON, RESPIRATIONS EVEN/UNLABORED, NO
IMMEDIATE NEEDS NOTED, FLUIDS/CALL LIGHT WITHIN REACH, PT REPORTS EXCESSIVE
PAIN EARLIER TODAY AFTER THERAPY, PT A LITTLE EMOTIONAL AT THIS TIME AFRAID OF
THE PAIN.

## 2020-09-18 VITALS — SYSTOLIC BLOOD PRESSURE: 139 MMHG | DIASTOLIC BLOOD PRESSURE: 74 MMHG

## 2020-09-18 LAB
ANION GAP SERPL CALC-SCNC: 12.9 MMOL/L (ref 8–16)
BASOPHILS NFR BLD AUTO: 0.4 % (ref 0–2)
BILIRUB SERPL-MCNC: NEGATIVE MG/DL
BUN SERPL-MCNC: 19 MG/DL (ref 7–18)
CALCIUM SERPL-MCNC: 8.8 MG/DL (ref 8.5–10.1)
CHLORIDE SERPL-SCNC: 103 MMOL/L (ref 98–107)
CO2 SERPL-SCNC: 26.7 MMOL/L (ref 21–32)
CREAT SERPL-MCNC: 0.9 MG/DL (ref 0.6–1.3)
EOSINOPHIL NFR BLD: 2.6 % (ref 0–7)
ERYTHROCYTE [DISTWIDTH] IN BLOOD BY AUTOMATED COUNT: 14.1 % (ref 11.5–14.5)
GLUCOSE SERPL-MCNC: 126 MG/DL (ref 74–106)
HCT VFR BLD CALC: 36.9 % (ref 36–48)
HGB BLD-MCNC: 11.5 G/DL (ref 12–16)
IMM GRANULOCYTES NFR BLD: 1.1 % (ref 0–5)
KETONES UR STRIP-MCNC: NEGATIVE MG/DL
LYMPHOCYTES NFR BLD AUTO: 38.8 % (ref 15–50)
MCH RBC QN AUTO: 28 PG (ref 26–34)
MCHC RBC AUTO-ENTMCNC: 31.2 G/DL (ref 31–37)
MCV RBC: 89.8 FL (ref 80–100)
MONOCYTES NFR BLD: 11.1 % (ref 2–11)
NEUTROPHILS NFR BLD AUTO: 46 % (ref 40–80)
NITRITE UR-MCNC: NEGATIVE MG/ML
OSMOLALITY SERPL CALC.SUM OF ELEC: 281 MOSM/KG (ref 275–300)
PH UR STRIP: 5 [PH] (ref 5–8)
PLATELET # BLD: 345 10X3/UL (ref 130–400)
PMV BLD AUTO: 10.3 FL (ref 7.4–10.4)
POTASSIUM SERPL-SCNC: 3.6 MMOL/L (ref 3.5–5.1)
RBC # BLD AUTO: 4.11 10X6/UL (ref 4–5.4)
SODIUM SERPL-SCNC: 139 MMOL/L (ref 136–145)
UROBILINOGEN UR-MCNC: NORMAL MG/DL (ref ?–2)
WBC # BLD AUTO: 5.7 10X3/UL (ref 4.8–10.8)

## 2020-09-18 NOTE — NUR
PT'S UA RESULTS CAME BACK NORMAL H&H A LITTLE LOW PLTS SLIGHT INCREASE NOT OUT
ENOUGH TO CAUSE CONCERN

## 2020-09-18 NOTE — NUR
PATIENT RECEIVED SITTING UP IN BED WATCHING TV. ASSESSMENT & VITAL SIGNS DONE.
BED LOW. CALL LIGHT WITHIN REACH. WILL CONTINUE TO MONITOR.

## 2020-09-18 NOTE — NUR
PEARL TOPETE, FROM DR. ROMERO'S SERVICE CHANGED THE DRESSING TO THE LEFT
THIGH AREA, WITH THE IMMOBILIZER IN PLACE. THE CALL LIGHT IS WITHIN REACH.

## 2020-09-18 NOTE — NUR
SHE IS ALERT, TALKING. THE DRESSING TO THE LEFT HIP IS LOOSE, SMALL AMOUNT OF
DRAINAGE. THE CALL LIGHT IS WITHIN.

## 2020-09-18 NOTE — NUR
PATIENT USED CALL LIGHT FOR ASSIST ONTO BED PAN. PATIENT HAD VOID ONLY.
PATIENT CLEANED. BED LOW. CALL LIGHT WITHIN REACH. WILL CONTINUE TO MONITOR.

## 2020-09-19 VITALS — SYSTOLIC BLOOD PRESSURE: 158 MMHG | DIASTOLIC BLOOD PRESSURE: 84 MMHG

## 2020-09-19 VITALS — SYSTOLIC BLOOD PRESSURE: 148 MMHG | DIASTOLIC BLOOD PRESSURE: 74 MMHG

## 2020-09-19 NOTE — NUR
PATIENT GIVEN PAIN MEDICATION FOR LEFT KNEE & LEG PAIN. PATIENT MINIMAL ASSIST
ONTO BED PAN. VOID ONLY. JACKSON AREA & BUTTOCKS CLEANED. BED LOW. ICE PACK TO
LEFT KNEE. IMMOBILIZER ON. CALL LIGHT WITHIN REACH. WILL CONTINUE TO MONITOR.

## 2020-09-19 NOTE — NUR
SHE IS ALERT, TALKING. THE IMMOBILIZER IS ON THE LEFT LEG, THE DRESSING IS
INTACT. SHE HAD A BM THIS MORRING. THE CALL LIGHT IS WITHIN REACH.

## 2020-09-19 NOTE — NUR
PATIENT RECEIVED LAYING BED WATCHING TV. LEFT LEG IMMOBILIZER ON & LEG
ELEVATED ON PILLOW. ASSESSMENT & VITAL SIGNS DONE. NO C/O PAIN OR DISTRESS.
BED LOW. CALL LIGHT & WATER WITHIN REACH. WILL CONTINUE TO MONITOR.

## 2020-09-19 NOTE — NUR
PATIENT EYES CLOSED. RESPIRATIONS 18 & EVEN. BED LOW. IMMOBILIZER ON LEFT LEG.
BED LOW. CALL LIGHT WITHIN REACH. WILL CONTINUE TO MONITOR.

## 2020-09-20 VITALS — SYSTOLIC BLOOD PRESSURE: 138 MMHG | DIASTOLIC BLOOD PRESSURE: 79 MMHG

## 2020-09-20 VITALS — DIASTOLIC BLOOD PRESSURE: 72 MMHG | SYSTOLIC BLOOD PRESSURE: 146 MMHG

## 2020-09-20 NOTE — NUR
PATIENT USED CALL LIGHT FOR ASSIST. PATIENT PLACED ON BEDPAN, MINIMAL ASSIST.
VOID ONLY. ICE PACK PLACED ON KNEE. IMMOBILIZER ON & PILLOWS UNDER LEFT LEG.
PAIN LEVEL 8. PATIENT GIVEN 2 PAIN MEDICATION PILLS FOR LEVEL 8. PATIENT BED
LOW, CALL LIGHT WITHIN REACH. WILL CONTINUE TO MONITOR.

## 2020-09-20 NOTE — NUR
PATIENT C/O PAIN LEVEL 8 TO LEFT LEG & KNEE. PATIENT GIVEN 2 PAIN MEDICATIONS
PER ORDER. BED LOW. CALL LIGHT WITHIN REACH. WILL CONTINUE TO MONITOR.

## 2020-09-20 NOTE — NUR
PATIENT IMMOBILIZER STRAPS LOOSENED. OLD DRESSINGS TAKEN OFF. STAPLES INTACT
ON ALL SITES. NEW DRESSINGS APPLIED TO INCISION SITES. ICE PACK APPLIED TO
LEFT KNEE. IMMOBILIZER STRAPS TIGHTENED. LEFT LEG ELEVATED ON PILLOW. CALL
LIGHT WITHIN REACH. WILL CONTINUE TO MONITOR.

## 2020-09-20 NOTE — NUR
PATIENT RECEIVED SITTING UP IN BED WATCHING TV. ASSESSMENT & VITAL SIGNS DONE.
IMMOBILIZER ON LEFT LEG. NO C/O PAIN OR DISTRESS AT THIS TIME. BED LOW. CALL
LIGHT WITHIN REACH. WILL CONTINUE TO MONITOR.

## 2020-09-20 NOTE — NUR
THIS NURSE PLACED THE PATIENT ON THE BEDPAN. PT HAD BM. CLEANED PATIENT UP AND
DELIVERED BREAKFAST TRAY. VITALS STABLE

## 2020-09-21 VITALS — DIASTOLIC BLOOD PRESSURE: 69 MMHG | SYSTOLIC BLOOD PRESSURE: 135 MMHG

## 2020-09-21 LAB
ANION GAP SERPL CALC-SCNC: 11.6 MMOL/L (ref 8–16)
BASOPHILS NFR BLD AUTO: 0.2 % (ref 0–2)
BUN SERPL-MCNC: 17 MG/DL (ref 7–18)
CALCIUM SERPL-MCNC: 8.6 MG/DL (ref 8.5–10.1)
CHLORIDE SERPL-SCNC: 104 MMOL/L (ref 98–107)
CO2 SERPL-SCNC: 28 MMOL/L (ref 21–32)
CREAT SERPL-MCNC: 0.9 MG/DL (ref 0.6–1.3)
EOSINOPHIL NFR BLD: 6.9 % (ref 0–7)
ERYTHROCYTE [DISTWIDTH] IN BLOOD BY AUTOMATED COUNT: 14.2 % (ref 11.5–14.5)
GLUCOSE SERPL-MCNC: 121 MG/DL (ref 74–106)
HCT VFR BLD CALC: 34.6 % (ref 36–48)
HGB BLD-MCNC: 10.6 G/DL (ref 12–16)
IMM GRANULOCYTES NFR BLD: 0.6 % (ref 0–5)
LYMPHOCYTES NFR BLD AUTO: 37 % (ref 15–50)
MCH RBC QN AUTO: 27.5 PG (ref 26–34)
MCHC RBC AUTO-ENTMCNC: 30.6 G/DL (ref 31–37)
MCV RBC: 89.9 FL (ref 80–100)
MONOCYTES NFR BLD: 10.8 % (ref 2–11)
NEUTROPHILS NFR BLD AUTO: 44.5 % (ref 40–80)
OSMOLALITY SERPL CALC.SUM OF ELEC: 281 MOSM/KG (ref 275–300)
PLATELET # BLD: 392 10X3/UL (ref 130–400)
PMV BLD AUTO: 9.5 FL (ref 7.4–10.4)
POTASSIUM SERPL-SCNC: 3.6 MMOL/L (ref 3.5–5.1)
RBC # BLD AUTO: 3.85 10X6/UL (ref 4–5.4)
SODIUM SERPL-SCNC: 140 MMOL/L (ref 136–145)
WBC # BLD AUTO: 5.1 10X3/UL (ref 4.8–10.8)

## 2020-09-21 NOTE — NUR
AWAKE AND ALERT. RESTING IN BED WITH RESPIRATIONS UNLABORED. LEFT LEG
STABILIZER IN PLACE TO LEFT FEMUR FRACTURE. ICE PACK APPLIED TO UPPER LEFT LEG
PER HER REQUEST. NO ACUTE DISTRESS NOTED. CALL LIGHT IN REACH.

## 2020-09-21 NOTE — NUR
PATIENT AWAKE USING CALL LIGHT FOR ASSIST. PATIENT TURNED TO LEFT SIDE. BED
PAN PLACED UNDER BUTTOCKS. VOID ONLY. PATIENT PERIAREA & BUTTOCKS CLEANED.
CLEAN DRAW SHEET PLACED UNDER BUTTOCKS. CALL LIGHT WITHIN REACH. WILL CONTINUE
TO MONITOR.

## 2020-09-21 NOTE — NUR
Nutrition Follow-up:
Diet: Regular
PO intake: ~92% average x last 9 meals. States that her appetite is "so-so."
Last BM: 9/19/20. Wt: 330# (9/17/20)
Meds noted: miralax, k-dur, lasix. Labs noted: Glu 121(H)
Recommend continue current diet. RD following.

## 2020-09-22 VITALS — DIASTOLIC BLOOD PRESSURE: 81 MMHG | SYSTOLIC BLOOD PRESSURE: 148 MMHG

## 2020-09-22 VITALS — DIASTOLIC BLOOD PRESSURE: 85 MMHG | SYSTOLIC BLOOD PRESSURE: 170 MMHG

## 2020-09-22 NOTE — NUR
AWAKE AND ALERT. RESTING IN BED WITH RESPIRATIONS UNLABORED. IMMOBILIZER IN
PLACE TO LEFT LEG. NO DISTRESS NOTED. CALL LIGHT IN REACH.

## 2020-09-23 VITALS — SYSTOLIC BLOOD PRESSURE: 149 MMHG | DIASTOLIC BLOOD PRESSURE: 84 MMHG

## 2020-09-23 VITALS — SYSTOLIC BLOOD PRESSURE: 143 MMHG | DIASTOLIC BLOOD PRESSURE: 80 MMHG

## 2020-09-23 LAB
ANION GAP SERPL CALC-SCNC: 7.4 MMOL/L (ref 8–16)
BASOPHILS NFR BLD AUTO: 0.2 % (ref 0–2)
BUN SERPL-MCNC: 13 MG/DL (ref 7–18)
CALCIUM SERPL-MCNC: 8.5 MG/DL (ref 8.5–10.1)
CHLORIDE SERPL-SCNC: 104 MMOL/L (ref 98–107)
CO2 SERPL-SCNC: 30 MMOL/L (ref 21–32)
CREAT SERPL-MCNC: 0.8 MG/DL (ref 0.6–1.3)
EOSINOPHIL NFR BLD: 3 % (ref 0–7)
ERYTHROCYTE [DISTWIDTH] IN BLOOD BY AUTOMATED COUNT: 14.4 % (ref 11.5–14.5)
GLUCOSE SERPL-MCNC: 121 MG/DL (ref 74–106)
HCT VFR BLD CALC: 35.6 % (ref 36–48)
HGB BLD-MCNC: 11 G/DL (ref 12–16)
IMM GRANULOCYTES NFR BLD: 0.2 % (ref 0–5)
LYMPHOCYTES NFR BLD AUTO: 36.8 % (ref 15–50)
MCH RBC QN AUTO: 27.6 PG (ref 26–34)
MCHC RBC AUTO-ENTMCNC: 30.9 G/DL (ref 31–37)
MCV RBC: 89.4 FL (ref 80–100)
MONOCYTES NFR BLD: 9.6 % (ref 2–11)
NEUTROPHILS NFR BLD AUTO: 50.2 % (ref 40–80)
OSMOLALITY SERPL CALC.SUM OF ELEC: 276 MOSM/KG (ref 275–300)
PLATELET # BLD: 350 10X3/UL (ref 130–400)
PMV BLD AUTO: 9.3 FL (ref 7.4–10.4)
POTASSIUM SERPL-SCNC: 3.4 MMOL/L (ref 3.5–5.1)
RBC # BLD AUTO: 3.98 10X6/UL (ref 4–5.4)
SODIUM SERPL-SCNC: 138 MMOL/L (ref 136–145)
WBC # BLD AUTO: 4.7 10X3/UL (ref 4.8–10.8)

## 2020-09-23 NOTE — NUR
DRESSING CHANGE TO THE LEFT THIGH/KNEE, STAPLES INTACT WITHOUT REDNESS OR
DRAINAGE. DRESSED WITH 4X4'S AND TAPE. SHE JUST GOT OUT OF THE SHOWER.

## 2020-09-23 NOTE — NUR
PATIENT RECEIVED SITTING UP IN BED WATCHING TV. ASSESSMENT & VITAL SIGNS
DONE. IMMOBILIZER ON LEFT LEG. BED LOW. CALL LIGHT WITHIN REACH. WILL CONTINUE
TO MMONITOR.

## 2020-09-23 NOTE — NUR
SHE IS ALERT, TOOK HER MEDICATIONS WITHOUT ANY PROBLEMS. DID NOT WANT A PAIN
PILL THIS MORNING. THE CALL LIGHT IS WITHIN REACH.

## 2020-09-23 NOTE — NUR
QUIET HOURS. NO ACUTE CHANGES IN CONDITION THIS SHIFT. RESTING IN BED WITH
IMMOBILIZER IN PLACE TO LEFT LEG. NO DISTRESS NOTED.

## 2020-09-24 VITALS — SYSTOLIC BLOOD PRESSURE: 146 MMHG | DIASTOLIC BLOOD PRESSURE: 83 MMHG

## 2020-09-24 VITALS — SYSTOLIC BLOOD PRESSURE: 138 MMHG | DIASTOLIC BLOOD PRESSURE: 61 MMHG

## 2020-09-24 NOTE — NUR
CLINICAL UPDATES FAXED TO LAUREN BRIDGES AT [phone], AUTH. #
[id number] WITH A TENATIVE DC DATE OF 10/2/20. WILL CONTINUE TO FOLLOW WITH
PATIENT.

## 2020-09-24 NOTE — NUR
PATIENT USED CALL LIGHT FOR ASSIST. MINIMAL ASSIST INTO & OUT OF WHEELCHAIR &
ON & OFF COMMODE. VOID ONLY. RETURNED TO LOW BED. CALL LIGHT WITHIN REACH.
WILL CONTINUE TO MONITOR.

## 2020-09-24 NOTE — NUR
ASSESSMENT PER FLOW SHEET, LEFT LEFT INC WITH STERI STRIPS CDI WITH NO
DRAINAGE NOTED, ICE PACK IN PLACE, PT REPORTS FLATUS, BM TODAY AND VOIDING
WITH NO DIFFICULTY, PT INST ON AND DEMONSTRATED I.S. WITH GOOD EFFORT, PT C/O
PAIN, ADM [facility]CO PER MD ORDERS, SEE EMAR, WITH FRESH H20, PT DENIES FURTHER
NEEDS, FALL PRECAUTIONS IN PLACE

## 2020-09-24 NOTE — NUR
PATIENT EYES CLOSED. RESPIRATIONS 18 & EVEN. BED LOW. CALL LIGHT WITHIN REACH.
WILL CONTINUE TO [facility].

## 2020-09-24 NOTE — NUR
PT AWAKE, ADM 2100 MED PER MD ORDERS, SEE EMAR, WITH FRESH H20, PT REQUESTED
AND SERVED ORANGE SHERBERT, DENIES FURTHER NEEDS AT THIS TIME

## 2020-09-25 VITALS — SYSTOLIC BLOOD PRESSURE: 115 MMHG | DIASTOLIC BLOOD PRESSURE: 67 MMHG

## 2020-09-25 VITALS — DIASTOLIC BLOOD PRESSURE: 75 MMHG | SYSTOLIC BLOOD PRESSURE: 145 MMHG

## 2020-09-25 LAB
ANION GAP SERPL CALC-SCNC: 9.7 MMOL/L (ref 8–16)
BASOPHILS NFR BLD AUTO: 0.2 % (ref 0–2)
BUN SERPL-MCNC: 13 MG/DL (ref 7–18)
CALCIUM SERPL-MCNC: 8.4 MG/DL (ref 8.5–10.1)
CHLORIDE SERPL-SCNC: 103 MMOL/L (ref 98–107)
CO2 SERPL-SCNC: 26.9 MMOL/L (ref 21–32)
CREAT SERPL-MCNC: 0.8 MG/DL (ref 0.6–1.3)
EOSINOPHIL NFR BLD: 1.4 % (ref 0–7)
ERYTHROCYTE [DISTWIDTH] IN BLOOD BY AUTOMATED COUNT: 14.4 % (ref 11.5–14.5)
GLUCOSE SERPL-MCNC: 116 MG/DL (ref 74–106)
HCT VFR BLD CALC: 33.7 % (ref 36–48)
HGB BLD-MCNC: 10.5 G/DL (ref 12–16)
IMM GRANULOCYTES NFR BLD: 0 % (ref 0–5)
LYMPHOCYTES NFR BLD AUTO: 34.7 % (ref 15–50)
MCH RBC QN AUTO: 27.6 PG (ref 26–34)
MCHC RBC AUTO-ENTMCNC: 31.2 G/DL (ref 31–37)
MCV RBC: 88.7 FL (ref 80–100)
MONOCYTES NFR BLD: 9.2 % (ref 2–11)
NEUTROPHILS NFR BLD AUTO: 54.5 % (ref 40–80)
OSMOLALITY SERPL CALC.SUM OF ELEC: 272 MOSM/KG (ref 275–300)
PLATELET # BLD: 303 10X3/UL (ref 130–400)
PMV BLD AUTO: 9.8 FL (ref 7.4–10.4)
POTASSIUM SERPL-SCNC: 3.6 MMOL/L (ref 3.5–5.1)
RBC # BLD AUTO: 3.8 10X6/UL (ref 4–5.4)
SODIUM SERPL-SCNC: 136 MMOL/L (ref 136–145)
WBC # BLD AUTO: 5.7 10X3/UL (ref 4.8–10.8)

## 2020-09-25 NOTE — NUR
PT RESTING WITH EYES CLOSED, AROUSES TO SOFT VERBAL STIMULATION, ADM 0600 MED
PER MD ORDERS, SEE EMAR, WITH FRESH H20, PT DENIES NEEDS AT THIS TIME

## 2020-09-25 NOTE — NUR
PATIENT RECEIVED LAYING IN BED. ASSESSMENT & VITAL SIGNS  DONE. NO C/O PAIN OR
DISTRESS AT THIS TIME. BED LOW. CALL LIGHT WITHIN REACH. WILL CONTINUE TO
MONITOR.

## 2020-09-25 NOTE — NUR
Nutrition Follow-up:
Diet: Regular
PO intake: ~94% average x last 9 meals
Last BM: 9/24/20. Wt: 330# (9/17/20)
Meds noted: miralax, lasix, k-dur. Labs noted: Glu 116(H)
Recommend continue current diet. RD following.

## 2020-09-25 NOTE — NUR
PT AWAKE, C/O LEFT LEG PAIN, ADM PAIN MED PER MD ORDERS, SEE EMAR, PT UP TO BR
VIA WC WITH ASSISTANCE, VOIDED WITH NO DIFFICULTY, DID JACKSON CARE FOR PT, PT
BACK TO BED, REQUESTED AND PROVIDED ICE PACK AND FRESH H20, DENIES FURTHER
NEEDS, FALL PRECAUTIONS IN PLACE

## 2020-09-25 NOTE — NUR
PATIENT IS ALERT/ORIENT. SITTING UP IN BED TO EAT BREAKFAST. CALL LIGHT WITHIN
REACH. VOICES NO NEEDS AT THIS TIME.

## 2020-09-26 VITALS — DIASTOLIC BLOOD PRESSURE: 67 MMHG | SYSTOLIC BLOOD PRESSURE: 130 MMHG

## 2020-09-26 VITALS — SYSTOLIC BLOOD PRESSURE: 114 MMHG | DIASTOLIC BLOOD PRESSURE: 68 MMHG

## 2020-09-26 NOTE — NUR
PATIENT RECEIVED SITTING UP IN BED WATCHING TV. ASSESSMENT & VITAL SIGNS DONE.
NO C/O PAIN OR DISTRESS AT THIS TIME. BED LOW. CALL LIGHT & TABLE AT BED SIDE
TABLE WITHIN REACH. WILL CONTINUE TO MONITOR.

## 2020-09-27 VITALS — DIASTOLIC BLOOD PRESSURE: 55 MMHG | SYSTOLIC BLOOD PRESSURE: 123 MMHG

## 2020-09-27 VITALS — SYSTOLIC BLOOD PRESSURE: 124 MMHG | DIASTOLIC BLOOD PRESSURE: 64 MMHG

## 2020-09-27 NOTE — NUR
PATIENT GIVEN PAIN MEDICATIONS PER ORDER. STANDBY ASSIST INTO & OUT OF
WHEELCHAIR. THIS NURSE ASSISTED WITH PROPELLING WHEELCAHIR. STANDBY ASSIST
ONTO & OFF OF COMMODE. VOID ONLY. RED AREA LINE & OPEN AREA TO MIDDLE OF BACK.
PASTE APPLIED. WILL CONTINUE TO MONITOR. BED LOW. CALL LIGHT WITHIN REACH.
WILL CONTINUE TO MONT[place].

## 2020-09-27 NOTE — NUR
PT SITTING UP IN BED WATCHING TV. CL IN REACH. DENIES NEEDS AT THIS TIME.
INTRODUCED SELF TO PT. A/O X4. CONTINENT OF URINE AND BOWEL. MINIMAL ASSIST TO
BATHROOM. INCISION SITE INTACT WITH STERI STRIPS IN PLACE. WILL CONTINUE TO
MONITOR.

## 2020-09-27 NOTE — NUR
PATIENT IS ALERT/ORIENT. SITTING UP IN BED WATCHING T.V. CALL LIGHT WITHIN
REACH. VOICES NO NEEDS AT THIS TIME

## 2020-09-27 NOTE — NUR
PATIENT EYES CLOSED. RESPIRATIONS 18 & EVEN. BED LOW. TABLE & CALL LIGHT
WITHINR EACH. WILL CONTINUE TO MONITOR.

## 2020-09-28 VITALS — SYSTOLIC BLOOD PRESSURE: 119 MMHG | DIASTOLIC BLOOD PRESSURE: 69 MMHG

## 2020-09-28 VITALS — DIASTOLIC BLOOD PRESSURE: 78 MMHG | SYSTOLIC BLOOD PRESSURE: 153 MMHG

## 2020-09-28 LAB
ANION GAP SERPL CALC-SCNC: 11.7 MMOL/L (ref 8–16)
BASOPHILS NFR BLD AUTO: 0.7 % (ref 0–2)
BUN SERPL-MCNC: 14 MG/DL (ref 7–18)
CALCIUM SERPL-MCNC: 8.9 MG/DL (ref 8.5–10.1)
CHLORIDE SERPL-SCNC: 106 MMOL/L (ref 98–107)
CO2 SERPL-SCNC: 28 MMOL/L (ref 21–32)
CREAT SERPL-MCNC: 0.8 MG/DL (ref 0.6–1.3)
EOSINOPHIL NFR BLD: 6.3 % (ref 0–7)
ERYTHROCYTE [DISTWIDTH] IN BLOOD BY AUTOMATED COUNT: 14.4 % (ref 11.5–14.5)
GLUCOSE SERPL-MCNC: 120 MG/DL (ref 74–106)
HCT VFR BLD CALC: 34.6 % (ref 36–48)
HGB BLD-MCNC: 10.8 G/DL (ref 12–16)
IMM GRANULOCYTES NFR BLD: 0.2 % (ref 0–5)
LYMPHOCYTES NFR BLD AUTO: 37.2 % (ref 15–50)
MCH RBC QN AUTO: 27.9 PG (ref 26–34)
MCHC RBC AUTO-ENTMCNC: 31.2 G/DL (ref 31–37)
MCV RBC: 89.4 FL (ref 80–100)
MONOCYTES NFR BLD: 10.2 % (ref 2–11)
NEUTROPHILS NFR BLD AUTO: 45.4 % (ref 40–80)
OSMOLALITY SERPL CALC.SUM OF ELEC: 284 MOSM/KG (ref 275–300)
PLATELET # BLD: 321 10X3/UL (ref 130–400)
PMV BLD AUTO: 9.8 FL (ref 7.4–10.4)
POTASSIUM SERPL-SCNC: 3.7 MMOL/L (ref 3.5–5.1)
RBC # BLD AUTO: 3.87 10X6/UL (ref 4–5.4)
SODIUM SERPL-SCNC: 142 MMOL/L (ref 136–145)
WBC # BLD AUTO: 5.7 10X3/UL (ref 4.8–10.8)

## 2020-09-28 NOTE — NUR
SHE IS SETTING UP IN THE WHEELCHAIR, TOOK HER MEDICATIONS WITHOUT ANY
PROBLEMS. THE CALL LIGHT IS WITHIN REACH.

## 2020-09-28 NOTE — NUR
CLINICAL UPDATES FAXED TO [facility] , [phone], AUTH. #
[id number], WITH TENATIVE DC Date of 10/2/20. will continue to follow with
patient.

## 2020-09-28 NOTE — NUR
PT IN BED WATCHING TV, NO NEEDS NOTED AT THIS TIME, RESPIRATIONS
EVEN/UNLABORED, FALL PRECAUTIONS IN PLACE, FLUIDS/CALL LIGHT WITHIN REACH

## 2020-09-29 VITALS — DIASTOLIC BLOOD PRESSURE: 72 MMHG | SYSTOLIC BLOOD PRESSURE: 144 MMHG

## 2020-09-29 VITALS — DIASTOLIC BLOOD PRESSURE: 66 MMHG | SYSTOLIC BLOOD PRESSURE: 135 MMHG

## 2020-09-29 NOTE — NUR
PT IN BED WATCHING TV, RESPIRATIONS EVEN/UNLABORED, NO IMMEDIATE NEEDS NOTED,
FALL PRECAUTIONS IN PLACE, FLUIDS/CALL LIGHT WITHIN REACH

## 2020-09-29 NOTE — NUR
SHE IS ALERT, GOING TO THE BATHROOM WITH 1 PERSON ASSIST USING THE WHEELCHAIR.
STERI STRIPS TO THE LEFT THIGH/KNEE AREA WITHOUT REDNESS. SHE HAS A RED PLACE
ON HER BACK, LOOKS LIKE HER CLOTHES RUBBED HER. I APPLIED A DRESSING TO THAT
AREA. THE CALL LIGHT IS WITHIN REACH.

## 2020-09-29 NOTE — NUR
PT IN BED ASLEEP, AROUSES EASILY TO VOICE
NO NEEDS NOTED AT THIS TIME, RESPIRATIONS
EVEN/UNLABORED, FALL PRECAUTIONS IN PLACE, FLUIDS/CALL LIGHT WITHIN REACH

## 2020-09-29 NOTE — NUR
Nutrition Follow-up:
Diet: Regular
PO intake: 100% x most meals (~94% average x last 9 meals)
Last BM: 9/27/20. Wt: 330# (9/17/20)
Meds notes: k-dur, miralax, lasix. Labs noted: Glu 120(H)
Recommend continue current diet. RD following.

## 2020-09-29 NOTE — NUR
PT IN BED ASLEEP,AROUSES EASILY TO VOICE
NO NEEDS NOTED AT THIS TIME, RESPIRATIONS
EVEN/UNLABORED, FALL PRECAUTIONS IN PLACE, FLUIDS/CALL LIGHT WITHIN REACH

## 2020-09-30 VITALS — SYSTOLIC BLOOD PRESSURE: 149 MMHG | DIASTOLIC BLOOD PRESSURE: 67 MMHG

## 2020-09-30 VITALS — DIASTOLIC BLOOD PRESSURE: 64 MMHG | SYSTOLIC BLOOD PRESSURE: 132 MMHG

## 2020-09-30 LAB
ANION GAP SERPL CALC-SCNC: 9.5 MMOL/L (ref 8–16)
BASOPHILS NFR BLD AUTO: 0.6 % (ref 0–2)
BUN SERPL-MCNC: 11 MG/DL (ref 7–18)
CALCIUM SERPL-MCNC: 8.9 MG/DL (ref 8.5–10.1)
CHLORIDE SERPL-SCNC: 105 MMOL/L (ref 98–107)
CO2 SERPL-SCNC: 27.4 MMOL/L (ref 21–32)
CREAT SERPL-MCNC: 0.8 MG/DL (ref 0.6–1.3)
EOSINOPHIL NFR BLD: 8.4 % (ref 0–7)
ERYTHROCYTE [DISTWIDTH] IN BLOOD BY AUTOMATED COUNT: 14.4 % (ref 11.5–14.5)
GLUCOSE SERPL-MCNC: 121 MG/DL (ref 74–106)
HCT VFR BLD CALC: 35.1 % (ref 36–48)
HGB BLD-MCNC: 11 G/DL (ref 12–16)
IMM GRANULOCYTES NFR BLD: 0.4 % (ref 0–5)
LYMPHOCYTES NFR BLD AUTO: 38.6 % (ref 15–50)
MCH RBC QN AUTO: 27.8 PG (ref 26–34)
MCHC RBC AUTO-ENTMCNC: 31.3 G/DL (ref 31–37)
MCV RBC: 88.9 FL (ref 80–100)
MONOCYTES NFR BLD: 9.6 % (ref 2–11)
NEUTROPHILS NFR BLD AUTO: 42.4 % (ref 40–80)
OSMOLALITY SERPL CALC.SUM OF ELEC: 275 MOSM/KG (ref 275–300)
PLATELET # BLD: 350 10X3/UL (ref 130–400)
PMV BLD AUTO: 9.1 FL (ref 7.4–10.4)
POTASSIUM SERPL-SCNC: 3.9 MMOL/L (ref 3.5–5.1)
RBC # BLD AUTO: 3.95 10X6/UL (ref 4–5.4)
SODIUM SERPL-SCNC: 138 MMOL/L (ref 136–145)
WBC # BLD AUTO: 5.1 10X3/UL (ref 4.8–10.8)

## 2020-09-30 NOTE — NUR
PT IN BED WATCHING TV
RESPIRATIONS EVEN/UNLABORED, NO IMMEDIATE NEEDS NOTED,
FALL PRECAUTIONS IN PLACE, FLUIDS/CALL LIGHT WITHIN REACH

## 2020-09-30 NOTE — NUR
PT ASLEEP, AROUSES EASILY TO VOICE
RESPIRATIONS EVEN/UNLABORED, NO IMMEDIATE NEEDS NOTED,
FALL PRECAUTIONS IN PLACE, FLUIDS/CALL LIGHT WITHIN REACH

## 2020-09-30 NOTE — NUR
CARE TEAM MEETING:
PATIENT IS PROGRSSING IN THERAPY. TENATIVE DC DATE IS 10/2/20. WILL CONTINUE
TO FOLLOW WITH PATIENT.

## 2020-10-01 VITALS — DIASTOLIC BLOOD PRESSURE: 70 MMHG | SYSTOLIC BLOOD PRESSURE: 144 MMHG

## 2020-10-01 VITALS — SYSTOLIC BLOOD PRESSURE: 127 MMHG | DIASTOLIC BLOOD PRESSURE: 63 MMHG

## 2020-10-01 NOTE — NUR
PT SITTING UP IN BED WATCHING TV. CL IN REACH. DENIES NEEDS AT THIS TIME. BED
IN LOW SIDE RAILS X2. RESP EVEN AND UNLABORED. A/O X4. INTRODUCED SELF TO PT.
WILL CONTINUE TO MONITOR.

## 2020-10-02 VITALS — DIASTOLIC BLOOD PRESSURE: 69 MMHG | SYSTOLIC BLOOD PRESSURE: 126 MMHG

## 2020-10-02 NOTE — NUR
PATIENT DISCHARGING HOME TODAY WITH FAMILY.[facility] WILL PROVIDE
THERAPY AT HOME.[facility] WILL DELIVER A WHEELCHAIR TO PATIENT. DR. CAMPBELL
10/8/20 @ 10:00, DR. CROWE 10/23/20 @ 10:00. FLORINA SIGNED, IMM SERVED AND
EXPLAINED. ONE GIVEN TO PATIENT AND ONE FILED IN CHART. DISCHARGE INSTRUCTIONS
[facility]UREN BRIDG[facility] AT [phone], AUTH.
# [id number] WITH FAX CONFORMATION RECIEVED) AND REVIEWED WITH PATIENT PER
PRIMARY NURSE.

## 2020-10-02 NOTE — NUR
PT DISCHARGED TO HOME VIA WHEELCHAIR WITH  MEDS CALLED TO [facility]
PHARMACY PT DISCHARGE INSTRUCTIONS AND DISCHARGE MEDS REVIEWED NO QUESTIONS OR
CONCERNS

## 2020-10-02 NOTE — NUR
PT RESTING QUIETLY. NO DISTRESS NOTED. CL IN REACH. [facility] BED ALARM ON

## 2020-10-04 NOTE — RHP
PATIENT: CAROLYN ANGULO                             MEDICAL RECORD: [id number]
ACCOUNT: [id number]                                    LOCATION:[facility]   [id number]
: 68                                            ADMISSION DATE: 20
                                                         
 
                     REHABILITATION HISTORY AND PHYSICAL EXAMINATION
                         POST ADMISSION PHYSICIAN EXAMINATION
 
 
ADMITTING DIAGNOSIS:  Closed fracture of her left distal femur.
 
HISTORY OF PRESENT ILLNESS:  The patient is a 50-year-old morbidly obese female
that arrives with complaints of falling 10 days prior to midday with complaints
of pain to her lower extremity.  She has not been able to bear weight since that
time.  On the date of injury, she was seen in the [facility] ED.  She was
evaluated and noted to have an injury to her left knee and at that time her
x-ray suggested there was no acute fracture or dislocation.  There was an
effusion.  Orthopedic surgery was consulted as an outpatient.  She was unable to
bear weight and had appointment and came in and make it to her appointment.  She
came in via EMS.  She was found to have a left femur, which showed a fracture of
the distal femur including the lateral femoral condyle.  The patient had
orthopedic surgery consult.  The patient underwent surgery on 09/10/2020.  She
has been receiving PT and OT and progressing well since the surgery.  She is
currently on supplemental O2.  She is nonweightbearing status to her lower
extremity.  She has been monitored for pain control.  The patient has been
somewhat depressed during her stay.  She has got postop anemia.  Monitoring her
I's and O's.  She is on a stool softener.  She is on anticoagulation for this. 
She is having proximal muscle weakness, fatigue, shortness of breath, debility,
deconditioning, impaired mobility.  She has got gait disturbance.  She is a fall
risk and has self-care deficits.  These are barriers to her discharge home.  She
lives at home alone, was independent with ADLs and mobility prior to this. 
Currently, she is set-up for max assist for ADLs, mod to max assist for mobility
with nonweightbearing status to her left extremity.  She plans to be able to
return home hopefully close to her prior level of functioning as possible.
 
COMORBIDITIES:  Include weakness, morbid obesity, knee pain, left distal femur
fracture, fracture of the distal femur and distal lateral femoral condyle,
hypertension, chronic back pain and leukocytosis.
 
PAST MEDICAL HISTORY:  Significant for hypertension and chronic back pain.
 
PAST SURGICAL HISTORY:  Includes hysterectomy, appendectomy, gallbladder surgery
and right arm surgery.
 
ALLERGIES:  LISINOPRIL AND NORVASC.
 
CURRENT MEDICATIONS:  Include potassium 20 mEq daily, metoprolol 50 mg daily,
furosemide 40 mg daily, she is on Lovenox 40 mg daily, she is on Protonix 40 mg
daily, Colace 100 mg b.i.d., polyethylene glycol 17 grams in 8 ounces of water,
she is on Norco 7.5/325 one to two tabs every 4 to 6 hours p.r.n., she is on
hydralazine 10 mg every 6 hours as needed for elevated systolic blood pressure,
Flexeril 10 mg t.i.d. p.r.n., albuterol 2.5 mg as needed for updrafts and
acetaminophen 650 every 6 hours p.r.n.
 
HABITS:  No current alcohol or tobacco use.
 
FAMILY HISTORY:  Noncontributory.
 
 
 
 
HISTORY AND PHYSICAL                           [id number]    CAROLYN ANGULO     
 
 
SOCIAL HISTORY:  The patient hopes to return back home and get back to her prior
level of functioning.
 
REVIEW OF SYSTEMS:
GENERAL:  Does complain of weakness and fatigue.
HEENT:  Denies cold, cough, or congestion.
CARDIOVASCULAR:  Denies any chest pain.
 
PHYSICAL EXAMINATION:
VITAL SIGNS:  Stable, afebrile.
GENERAL:  A morbidly obese female in no acute distress upon exam.
HEENT:  Normocephalic and atraumatic.  Mucosa moist.
NECK:  Supple.  No lymphadenopathy.
LUNGS:  Clear in upper fields with decreased breath sounds in the bases
secondary to body habitus.
CARDIOVASCULAR:  Regular rate and rhythm.  No murmurs, rubs, or gallops.
ABDOMEN:  Soft, benign and nondistended.  Positive bowel sounds times 4.
EXTREMITIES:  Does have normal postop swelling in this area.
NEUROLOGIC:  She is mainly intact.  She does have some proximal muscle weakness.
 
LABORATORY DATA:  Her white count is 5.2.  Her H&H are 11 and 35, and platelet
count is 418.  Sodium is 139, potassium 3.8, BUN and creatinine of 15 and 0.9
and blood sugar is noted to be 131.
 
It should be noted that she did note some temperature during the night with
T-max of 101.1 with an elevated pulse rate at that time and a normal sat.
 
ASSESSMENT:  This 50-year-old female patient admitted to rehab with a working
diagnosis of status post open reduction and internal fixation of a distal femur
fracture.  The patient has potential to make improvement.  We instituted the
following multidisciplinary therapies including, not limited to physical,
occupational, respiratory, speech, nutritional services, prosthetics and
orthotics.  Given her complex medical condition and risks for more
complications, rehabilitation services cannot be provided at a low level of care
such as skilled nursing facility.
 
PLAN:
1.  Admit to [facility] for inpatient therapy to include the following
disciplines;
A.  Physical therapy to improve gait, all transfer skills and bed mobility to a
modified independent level.
B.  Occupational therapy to improve activities of daily living.
C.  Case management to help with discharge planning and placement options.
D.  Nutrition to assist with nutritional needs.
E.  Rehabilitation nursing to assist in monitoring the patient's underlying
medical conditions and to assist with any type of bowel or bladder management.
2.  The patient's current medication and medical care will be continued.
3.  The patient will be placed on standard fall precautions.
4.  The patient's estimated length of stay is approximately 7-10 days.
5.  I will go ahead and get a urinalysis on her and take a look at that to try
to find some source of her infection if that is indeed what is causing her fever
and I will followup in the a.m.
 
TRANSINT:[id number] Voice Confirmation ID: [id number] DOCUMENT ID: [id number]
 
 
 
 
HISTORY AND PHYSICAL                           [id number]    CAROLYN ANGULO notes whether there has been none or any medical/functional
change since admission:
- No change since preadmission screen.                                  
 
 
DAVIS attests patient continues to be appropriate for IRF:
- Continues to be appropriate.                                          
 
 
                                           
                                           TRICE ZIMMER MD           
 
 
 
Electronically Signed by TRICE ZIMMER on 10/04/20 at 1546
 
 
 
 
 
 
 
 
 
 
 
 
 
 
 
 
 
 
 
 
 
 
 
 
 
 
 
 
 
 
 
 
CC:                                                             [id number]
DICTATION DATE: 20 0838     :     20 0935      DIS IN  
                                                                      10/02/20
[id number] [place] [address]

## 2021-08-08 NOTE — NUR
ALERT AND ORIENTED X4. DRESSING INTACT TO LEFT LEG WITH BRACE. GENERALIZED
EDEMA NOTED TO BLE WITH PEDAL PULSES NOTED.  REQUIRES ASSIST WITH USE OF
BEDPAN. LARGE BM NOTED THIS AM. HYDROCODONE GIVEN FOR PAIN 5/10 THIS AM OF
EXTREMITY. ENCOURAGED USE OF CALL LIGHT FOR ASSSIT. 08-Aug-2021 11:00